# Patient Record
Sex: FEMALE | Race: WHITE | NOT HISPANIC OR LATINO | URBAN - METROPOLITAN AREA
[De-identification: names, ages, dates, MRNs, and addresses within clinical notes are randomized per-mention and may not be internally consistent; named-entity substitution may affect disease eponyms.]

---

## 2020-12-16 ENCOUNTER — EMERGENCY (EMERGENCY)
Facility: HOSPITAL | Age: 50
LOS: 1 days | Discharge: HOME | End: 2020-12-16
Admitting: EMERGENCY MEDICINE
Payer: COMMERCIAL

## 2020-12-16 VITALS
OXYGEN SATURATION: 98 % | RESPIRATION RATE: 16 BRPM | SYSTOLIC BLOOD PRESSURE: 145 MMHG | HEART RATE: 72 BPM | DIASTOLIC BLOOD PRESSURE: 97 MMHG | TEMPERATURE: 99 F

## 2020-12-16 DIAGNOSIS — Z20.828 CONTACT WITH AND (SUSPECTED) EXPOSURE TO OTHER VIRAL COMMUNICABLE DISEASES: ICD-10-CM

## 2020-12-16 LAB — SARS-COV-2 RNA SPEC QL NAA+PROBE: SIGNIFICANT CHANGE UP

## 2020-12-16 PROCEDURE — U0003: CPT

## 2020-12-16 PROCEDURE — 99283 EMERGENCY DEPT VISIT LOW MDM: CPT

## 2020-12-16 NOTE — ED PROVIDER NOTE - PATIENT PORTAL LINK FT
You can access the FollowMyHealth Patient Portal offered by [facility] by registering at the following website: [email or web address]. By joining [employer]’s FollowMyHealth portal, you will also be able to view your health information using other applications (apps) compatible with our system.

## 2020-12-24 ENCOUNTER — EMERGENCY (EMERGENCY)
Facility: HOSPITAL | Age: 50
LOS: 1 days | Discharge: HOME | End: 2020-12-24
Admitting: EMERGENCY MEDICINE
Payer: COMMERCIAL

## 2020-12-24 VITALS
OXYGEN SATURATION: 99 % | RESPIRATION RATE: 16 BRPM | DIASTOLIC BLOOD PRESSURE: 85 MMHG | TEMPERATURE: 98 F | SYSTOLIC BLOOD PRESSURE: 148 MMHG | HEART RATE: 77 BPM

## 2020-12-24 DIAGNOSIS — Z20.828 CONTACT WITH AND (SUSPECTED) EXPOSURE TO OTHER VIRAL COMMUNICABLE DISEASES: ICD-10-CM

## 2020-12-24 LAB — SARS-COV-2 RNA SPEC QL NAA+PROBE: SIGNIFICANT CHANGE UP

## 2020-12-24 PROCEDURE — U0003: CPT

## 2020-12-24 PROCEDURE — 99283 EMERGENCY DEPT VISIT LOW MDM: CPT

## 2020-12-24 NOTE — ED PROVIDER NOTE - PATIENT PORTAL LINK FT
You can access the FollowMyHealth Patient Portal offered by [facility] by registering at the following website: [email or web address]. By joining [facility]’s FollowMyHealth portal, you will also be able to view your health information using other applications (apps) compatible with our system.

## 2021-01-03 ENCOUNTER — EMERGENCY (EMERGENCY)
Facility: HOSPITAL | Age: 51
LOS: 1 days | Discharge: HOME | End: 2021-01-03
Admitting: EMERGENCY MEDICINE
Payer: COMMERCIAL

## 2021-01-03 VITALS
RESPIRATION RATE: 16 BRPM | SYSTOLIC BLOOD PRESSURE: 138 MMHG | DIASTOLIC BLOOD PRESSURE: 67 MMHG | TEMPERATURE: 98 F | HEART RATE: 72 BPM | OXYGEN SATURATION: 97 %

## 2021-01-03 DIAGNOSIS — Z20.822 CONTACT WITH AND (SUSPECTED) EXPOSURE TO COVID-19: ICD-10-CM

## 2021-01-03 LAB — SARS-COV-2 RNA SPEC QL NAA+PROBE: SIGNIFICANT CHANGE UP

## 2021-01-03 PROCEDURE — 99283 EMERGENCY DEPT VISIT LOW MDM: CPT

## 2021-01-03 PROCEDURE — U0003: CPT

## 2021-01-03 PROCEDURE — U0005: CPT

## 2021-01-03 NOTE — ED PROVIDER NOTE - PATIENT PORTAL LINK FT
You can access the FollowMyHealth Patient Portal offered by [facility] by registering at the following website: [email or web address]. By joining [email or web address]’s FollowMyHealth portal, you will also be able to view your health information using other applications (apps) compatible with our system.

## 2021-02-19 ENCOUNTER — EMERGENCY (EMERGENCY)
Facility: HOSPITAL | Age: 51
LOS: 1 days | Discharge: HOME | End: 2021-02-19
Admitting: EMERGENCY MEDICINE
Payer: COMMERCIAL

## 2021-02-19 VITALS
HEART RATE: 67 BPM | DIASTOLIC BLOOD PRESSURE: 73 MMHG | SYSTOLIC BLOOD PRESSURE: 120 MMHG | RESPIRATION RATE: 16 BRPM | TEMPERATURE: 98 F | OXYGEN SATURATION: 100 %

## 2021-02-19 DIAGNOSIS — Z20.822 CONTACT WITH AND (SUSPECTED) EXPOSURE TO COVID-19: ICD-10-CM

## 2021-02-19 LAB — SARS-COV-2 RNA SPEC QL NAA+PROBE: SIGNIFICANT CHANGE UP

## 2021-02-19 PROCEDURE — U0005: CPT

## 2021-02-19 PROCEDURE — 99282 EMERGENCY DEPT VISIT SF MDM: CPT

## 2021-02-19 PROCEDURE — 99283 EMERGENCY DEPT VISIT LOW MDM: CPT

## 2021-02-19 PROCEDURE — U0003: CPT

## 2021-02-19 NOTE — ED PROVIDER NOTE - OBJECTIVE STATEMENT
"Patient Emerita contacted with CT report results from 3/1/18 re: \"  IMPRESSION: Asymmetric degenerative changes of the sternoclavicular  joint, more prominent on the left as above. No lytic or destructive  lesions. No soft tissue masses.\" She feels a gym class that required free 8 lb weight use in each hand contributed to her L>R sterno clavicular pain. P: She is going out of town today and wanted these results read to her. She will RTC on her return to discuss findings in details and discuss any treatment plan with Dr Aguayo. Chaka Lock OPA    " Patient is presenting to the Emergency Department with a request to have COVID-19 testing.  Denies symptoms, including cough, shortness of breath, fever, chills, bodyaches or sore throat.

## 2021-02-19 NOTE — ED PROVIDER NOTE - PATIENT PORTAL LINK FT
You can access the FollowMyHealth Patient Portal offered by [facility] by registering at the following website: [email or web address]. By joining [employer]’s FollowMyHealth portal, you will also be able to view your health information using other applications (apps) compatible with our system.

## 2022-04-27 NOTE — ED PROVIDER NOTE - CARE PLAN
Chief Complaint:  Chief Complaint   Patient presents with   • Office Visit   • Hip Pain     right hip, groin and buttock pain   • Knee Pain     right knee pain         History of Present Illness:    She is a 65 year old female presents for re-evaluation of right hip pain and right knee pain. 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  Her hip pain progressively got worse and describes pain in her groin, buttock, pain going from a seated to standing position or standing to sitting position, ambulating, getting her leg in and out of car and ambulating up and down stairs.  She then started developing symptoms related to sciatica and was working with a chiropractor and massage which has helped.  CC is extending down the back of her leg.  She was seen by Dr. Rivera and was referred to the hospital for 2 injections for trochanteric bursitis.  She states the 1st 1 worked temporarily (24 hours) but no benefit to the 2nd. Has been working with PT for IT band syndrome. Prior x-ray showing mild arthritis and possible impingement. She is worried because the pain is now traveling to her right knee issues had a history of bilateral total knee arthroplasties completed greater than 15 years ago.  Denies any catching or locking instability recurrent effusion of the knee.    Past Histories:   I have reviewed the past medical history, medications and allergies listed in the medical record as well as the nursing notes for this encounter.    Social History:   Social History     Tobacco Use   • Smoking status: Never Smoker   • Smokeless tobacco: Never Used   Substance Use Topics   • Alcohol use: Yes     Alcohol/week: 1.0 standard drink     Types: 1 Standard drinks or equivalent per week     Comment: occasional  0-2 a month   • Drug use: No       Problem List:   I personally reviewed and updated in electronic  medical records    Review of Systems:  As stated in the HPI.    Physical exam:    General: Patient is a pleasant and cooperative 65 year old female alert and oriented x3. No acute distress.    Vitals: There were no vitals taken for this visit.    Musculoskeletal:  Body habitus consistent with BMI 52.53.  Examination right lower extremity greater left reveals no erythema edema ecchymosis with the treatment field representing realities.  Generalized tenderness through the ITB on with hypertonicity.  Difficulties getting in to examination table due to right hip pain.  Significant antalgic gait.  Increased pain through the groin with logroll internal and externally.  Positive central testing and FADIR.  Unable to position for fever and she states that this is consistent since her childhood.  Negative SLR.  Strength testing major muscular extremities is full and symmetrical.  Full active and passive range of motion right knee under strict and pain-free.  Knee is stable with varus valgus stressing.  Negative PEG jumping with anterior-posterior testing.  No appreciable effusion right knee no increased warmth to touch    Neurological: Light touch is grossly intact of the major dermatomal pattern of the lower extremities and symmetrical throughout.  Deep tendon reflexes of the patella and Achilles are 2+ and symmetric.  Negative clonus bilaterally.      Circulation: Dorsalis pedis pulses are intact and symmetrical.    Skin: Warm, dry, adequate texture and turgor.  No evidence of peripheral edema.     Imaging:  X-rays are obtained right knee.  Present reviewed images are reviewed office visit.  Shows stable appearing right total knee arthroplasty without evidence of lysis, loosening of hardware, polyethylene wear or subsidence.  Awaiting formal final report    Impression/plan:  · Chronic right hip pain    Differential diagnosis of right hip pain includes but not limited to ITB band syndrome, but concern for potential for  labral tear, impingement, stress fracture.  I think stress fracture is less likely given the longevity of her symptoms the for definitive imaging would recommend MRI arthrogram.  She is agreeable to this.  Due to the complexity of her symptoms would also like her to follow up in clinic for review of MRI results and these results to help guide treatment options.  Consider cane as device ice versus heat over-the-counter topical analgesics such as Voltaren gel, Tylenol for pain control.  Follow-up sooner if any rapid progression symptoms occur.    Orders Placed This Encounter   • XR Knee 3 View Right   • FL Hip Inj Pre CT/MRI Arthrogram RT   • MRI Arthrogram Hip RIGHT     FL HIP INJ PRE CT/MRI ARTHROGRAM RIGHT  MRI ARTHROGRAM HIP RIGHT  No follow-ups on file.  Instructions noted per AVS/patient instructions.  The patient indicates understanding of these issues and agrees with the plan.     Dr. Dobson, who serves as my supervising physician, was available for questioning regarding formulation of diagnosis, plan, and review of patient care. If Dr. Dobson is unavailable, an alternate supervising physician will cover Dr. Dobson's responsibilities based on an established yearly written agreement and daily availability of said physicians.     Principal Discharge DX:	Encounter for medical screening examination

## 2024-04-26 NOTE — ED PROVIDER NOTE - NS ED ROS FT
CONSTITUTIONAL:  No fever or chills, no bodyaches  HEENT:  No sore throat or headache, no nasal congestion  PULM:  No cough or shortness of breath  GI:  No diarrhea, no vomiting
Average

## 2024-08-11 NOTE — ED ADULT TRIAGE NOTE - CCCP TRG CHIEF CMPLNT
Patient presents to clinic with 11 day history of lower abdominal pain,nausea in the mornings, thoracic back pain. She is concerned that she is pregnant but has not taken an at home pregnancy test. Her LMP was 7/18/24 and is regular with her menses. She notes over the last two days scant dark bloody vaginal discharge. Has woken from sleep earlier than normal due to pain. Denies fever, cough, dysuria. Has had monogamous sex with partner, but inconsistent condom use.  No past medical history on file.  No past surgical history on file.  No current outpatient medications on file prior to visit.     No current facility-administered medications on file prior to visit.     /80   Pulse 69   Temp 97.8 °F (36.6 °C)   Resp 18   Ht 5' 2\" (1.575 m)   Wt 167 lb 1.7 oz (75.8 kg)   LMP 07/18/2024 (Exact Date)   SpO2 99%   BMI 30.56 kg/m²     GENERAL: well developed, well nourished,in no apparent distress  SKIN: no rashes,no suspicious lesions  HEENT: PERLLA, conjunctiva clear, atraumatic, normocephalic  NECK: supple,no adenopathy  LUNGS: clear to auscultation, no wheeze, rhonchi or rales noted  CARDIO: RRR without murmur  GI: +BS all quadrants, notes pain with palpation to RUQ and suprapubic  : not assessed inclinic  MUSCULOSKELETAL: no laxity of joints noted, normal gait. Negative for CVA tenderness. Non reproducible thoracic back pain.  EXTREMITIES: no cyanosis, clubbing or edema  NEURO: CN 2-12 grossly intact.  Results for orders placed or performed in visit on 08/11/24   Urine Preg Test    Collection Time: 08/11/24 11:04 AM   Result Value Ref Range    Pregnancy Test, Urine negative     Control Line Present with a clear background (yes/no) yes Yes/No    Kit Lot # [id number] Numeric    Kit Expiration Date 04- Date   Urine Dip, auto without Micro    Collection Time: 08/11/24 11:05 AM   Result Value Ref Range    Glucose Urine Negative Negative mg/dL    Bilirubin Urine Small (A) Negative    Ketones, UA Negative  Negative - Trace mg/dL    Spec Gravity 1.020 1.005 - 1.030    Blood Urine Moderate (A) Negative    PH Urine 7.5 5.0 - 8.0    Protein Urine Trace Negative - Trace mg/dL    Urobilinogen Urine 1.0 0.2 - 1.0 mg/dL    Nitrite Urine Negative Negative    Leukocyte Esterase Urine Negative Negative    APPEARANCE Clear Clear    Color Urine Yellow Yellow    Multistix Lot# [id number] Numeric    Multistix Expiration Date 05- Date     Accompanied by: self  After triage, higher acuity of care was recommended to Blanca   today.   Rationale: Need for further evaluation and management outside the scope of practice for the walk in clinic. Urine pregnancy negative, patient with thoracic back pain, upper and lower abdominal pain, nausea in the morning and unusual menses. Abnormal urine in clinic with moderate blood and trace protein, small for bilirubin and large for urobilinogen.  Site recommendation: ER for further exam.  Patient/parent verbalized understanding of rationale for further evaluation and was stable upon discharge. She will proceed to the ER for workup as discussed.       medical evaluation